# Patient Record
Sex: MALE | URBAN - METROPOLITAN AREA
[De-identification: names, ages, dates, MRNs, and addresses within clinical notes are randomized per-mention and may not be internally consistent; named-entity substitution may affect disease eponyms.]

---

## 2018-02-19 ENCOUNTER — OFFICE VISIT - HEALTHEAST (OUTPATIENT)
Dept: FAMILY MEDICINE | Facility: CLINIC | Age: 28
End: 2018-02-19

## 2018-02-19 DIAGNOSIS — Z13.220 SCREENING FOR LIPID DISORDERS: ICD-10-CM

## 2018-02-19 DIAGNOSIS — M25.532 BILATERAL WRIST PAIN: ICD-10-CM

## 2018-02-19 DIAGNOSIS — M54.6 THORACIC BACK PAIN: ICD-10-CM

## 2018-02-19 DIAGNOSIS — Z23 NEED FOR VACCINATION: ICD-10-CM

## 2018-02-19 DIAGNOSIS — Z00.00 ROUTINE HEALTH MAINTENANCE: ICD-10-CM

## 2018-02-19 DIAGNOSIS — M25.531 BILATERAL WRIST PAIN: ICD-10-CM

## 2018-02-19 DIAGNOSIS — Z13.1 SCREENING FOR DIABETES MELLITUS: ICD-10-CM

## 2018-02-19 ASSESSMENT — MIFFLIN-ST. JEOR: SCORE: 1777.25

## 2018-02-26 ENCOUNTER — COMMUNICATION - HEALTHEAST (OUTPATIENT)
Dept: FAMILY MEDICINE | Facility: CLINIC | Age: 28
End: 2018-02-26

## 2018-02-26 ENCOUNTER — AMBULATORY - HEALTHEAST (OUTPATIENT)
Dept: LAB | Facility: CLINIC | Age: 28
End: 2018-02-26

## 2018-02-26 DIAGNOSIS — Z13.1 SCREENING FOR DIABETES MELLITUS: ICD-10-CM

## 2018-02-26 DIAGNOSIS — Z13.220 SCREENING FOR LIPID DISORDERS: ICD-10-CM

## 2018-02-26 LAB
CHOLEST SERPL-MCNC: 145 MG/DL
FASTING STATUS PATIENT QL REPORTED: YES
FASTING STATUS PATIENT QL REPORTED: YES
GLUCOSE BLD-MCNC: 96 MG/DL (ref 70–125)
HDLC SERPL-MCNC: 50 MG/DL
LDLC SERPL CALC-MCNC: 84 MG/DL
TRIGL SERPL-MCNC: 54 MG/DL

## 2019-02-14 ENCOUNTER — COMMUNICATION - HEALTHEAST (OUTPATIENT)
Dept: NURSING | Facility: CLINIC | Age: 29
End: 2019-02-14

## 2020-10-26 ENCOUNTER — OFFICE VISIT - HEALTHEAST (OUTPATIENT)
Dept: UROLOGY | Facility: CLINIC | Age: 30
End: 2020-10-26

## 2020-10-26 DIAGNOSIS — N20.0 CALCULUS OF KIDNEY: ICD-10-CM

## 2020-10-26 DIAGNOSIS — N21.0 CALCULUS OF BLADDER: ICD-10-CM

## 2020-10-26 DIAGNOSIS — N50.811 PAIN IN RIGHT TESTICLE: ICD-10-CM

## 2020-10-26 DIAGNOSIS — N13.2 HYDRONEPHROSIS WITH URINARY OBSTRUCTION DUE TO URETERAL CALCULUS: ICD-10-CM

## 2020-10-26 RX ORDER — OXYCODONE HYDROCHLORIDE 5 MG/1
5 TABLET ORAL EVERY 6 HOURS PRN
Qty: 12 TABLET | Refills: 0 | Status: SHIPPED | OUTPATIENT
Start: 2020-10-26

## 2020-10-26 RX ORDER — ONDANSETRON 4 MG/1
4 TABLET, FILM COATED ORAL EVERY 8 HOURS PRN
Status: SHIPPED | COMMUNITY
Start: 2020-10-26

## 2020-10-26 RX ORDER — ACETAMINOPHEN 500 MG
1000 TABLET ORAL EVERY 6 HOURS PRN
Status: SHIPPED | COMMUNITY
Start: 2020-10-26

## 2020-10-26 RX ORDER — TAMSULOSIN HYDROCHLORIDE 0.4 MG/1
0.4 CAPSULE ORAL DAILY
Status: SHIPPED | COMMUNITY
Start: 2020-10-26

## 2020-10-29 ENCOUNTER — COMMUNICATION - HEALTHEAST (OUTPATIENT)
Dept: UROLOGY | Facility: CLINIC | Age: 30
End: 2020-10-29

## 2021-05-30 ENCOUNTER — RECORDS - HEALTHEAST (OUTPATIENT)
Dept: ADMINISTRATIVE | Facility: CLINIC | Age: 31
End: 2021-05-30

## 2021-06-01 VITALS — BODY MASS INDEX: 26.07 KG/M2 | WEIGHT: 182.1 LBS | HEIGHT: 70 IN

## 2021-06-12 NOTE — PROGRESS NOTES
Assessment/Plan:        Diagnoses and all orders for this visit:    Hydronephrosis with urinary obstruction due to ureteral calculus    Calculus of bladder    Calculus of kidney  -     oxyCODONE (ROXICODONE) 5 MG immediate release tablet; Take 1 tablet (5 mg total) by mouth every 6 (six) hours as needed for pain.  Dispense: 12 tablet; Refill: 0    Pain in right testicle  -     oxyCODONE (ROXICODONE) 5 MG immediate release tablet; Take 1 tablet (5 mg total) by mouth every 6 (six) hours as needed for pain.  Dispense: 12 tablet; Refill: 0    Other orders  -     acetaminophen (TYLENOL) 500 MG tablet; Take 1,000 mg by mouth every 6 (six) hours as needed for pain.  -     tamsulosin (FLOMAX) 0.4 mg cap; Take 0.4 mg by mouth daily.  -     oxybutynin (DITROPAN) 5 MG tablet; Take 5 mg by mouth 3 (three) times a day.  -     ondansetron (ZOFRAN) 4 MG tablet; Take 4 mg by mouth every 8 (eight) hours as needed for nausea.      Stone Management Plan  [facility] 10/26/2020   Urinary Tract Infection No suspicion of infection   Renal Colic Well controlled symptoms   Renal Failure No suspicion of renal failure   Current CT date 10/23/2020   Right sided stones? Yes   R Number of ureteral stones No ureteral stones   R Number of kidney stones  2   R GSD of kidney stones 4 - 10   R Hydronephrosis Mild   R Stone Event New event   Diagnosis date 10/23/2020   Initial location of primary symptomatic stone (No Data)   Initial GSD of primary symptomatic stone 3   R MET status Initiation   R Current Plan MET   MET 1 week F/U   Left sided stones? Yes   L Number of ureteral stones No ureteral stones   L Number of kidney stones  1   L GSD of kidney stones 4 - 10   L Hydronephrosis None   L Stone Event No current event   L Current Plan Observe   Observe rationale Limited stone burden with good prognosis for spontaneous passage         Phone call duration: 32 minutes    Alisa Aguilar PA-C    Subjective:        The patient has been notified  "of following:     \"This telephone visit will be conducted via a call between you and your physician/provider. We have found that certain health care needs can be provided without the need for a physical exam.  This service lets us provide the care you need with a short phone conversation. If a prescription is necessary, we can send it directly to your pharmacy.  If labs and/or imaging are needed, we can place orders so that you can have the test (s) done at a later time.    If during the course of the call the physician/provider feels a telephone visit is not appropriate, you will not be charged for this service.\"     HPI  Mr. Armand Nolasco is a 30 y.o. Yi male who is being evaluated via a billable telephone visit by [facility] following [facility] ER visit for urolithiasis.    He is a first time unidentified composition stone former. He has no identified modifiable stone risk factors. He has identified non-modifiable stone risks including:  bilateral stones.    He was seen in ER 10/23/20 for acute onset right lower abdominal pain x 1 day. The pain was sharp and constant, radiating to the right testicle. He had associated nausea and sweating. No associated alleviating or aggravating factors. Workup was notable for CT reporting a bladder stone, mild right hydronephrosis and renal stones. He was sent home with medications.     He has continued to have right sided pain in the flank and testicle. He has not seen a stone pass. He denies symptoms of fever, chills, nausea, vomiting, urinary frequency and dysuria.     CT scan from 10/23/20 is personally reviewed and demonstrates a mildly obstructing 3 mm stone in bladder. Nonobstructing stones lower poles of both kidneys, 2 on the right and 1 on the left, largest 5 mm.    Significant labs from presentation include moderate hematuria, mild pyuria, negative nitrite, no bacteria, no growth on urine culture, slightly WBC, normal creatinine and " normal potassium.    PLAN     29 yo Polish speaking M with interval passage of right renal stone, seen in bladder. Nonobstructing, bilateral renal stones with persistent right sided pain.    If he continues to have pain, will assume another stone event has evolved and will follow up with repeat imaging this week. Oxycodone was prescribed. Patient verbalized understanding. Patient agrees with plan as discussed. If pain resolves, he will be recommended to initiate prevention workup.       ROS   A 12 point comprehensive review of systems is negative except for HPI    Past Medical History:   Diagnosis Date     Gastric ulcer      Past Surgical History:   Procedure Laterality Date     NASAL FRACTURE SURGERY       Current Outpatient Medications   Medication Sig Dispense Refill     acetaminophen (TYLENOL) 500 MG tablet Take 1,000 mg by mouth every 6 (six) hours as needed for pain.       ibuprofen (ADVIL,MOTRIN) 600 MG tablet Take 1 tablet (600 mg total) by mouth every 6 (six) hours as needed for pain. 30 tablet 0     tamsulosin (FLOMAX) 0.4 mg cap Take 0.4 mg by mouth daily.       esomeprazole (NEXIUM) 20 MG capsule Take 20 mg by mouth as needed.       ondansetron (ZOFRAN) 4 MG tablet Take 4 mg by mouth every 8 (eight) hours as needed for nausea.       oxybutynin (DITROPAN) 5 MG tablet Take 5 mg by mouth 3 (three) times a day.       oxyCODONE (ROXICODONE) 5 MG immediate release tablet Take 1 tablet (5 mg total) by mouth every 6 (six) hours as needed for pain. 12 tablet 0     UNABLE TO FIND Take 1 tablet by mouth. Med Name: ultra triple joint health supplements       No current facility-administered medications for this visit.        Allergies   Allergen Reactions     Dust [Other Environmental Allergy] Shortness Of Breath     Hard to breath     Pollen Extracts Itching     Itchy eyes, sneezing       Social History     Socioeconomic History     Marital status:      Spouse name: Not on file     Number of children: Not  on file     Years of education: Not on file     Highest education level: Not on file   Occupational History     Not on file   Social Needs     Financial resource strain: Not on file     Food insecurity     Worry: Not on file     Inability: Not on file     Transportation needs     Medical: Not on file     Non-medical: Not on file   Tobacco Use     Smoking status: Never Smoker     Smokeless tobacco: Never Used   Substance and Sexual Activity     Alcohol use: Yes     Alcohol/week: 1.0 standard drinks     Types: 1 Cans of beer per week     Drug use: No     Sexual activity: Yes   Lifestyle     Physical activity     Days per week: Not on file     Minutes per session: Not on file     Stress: Not on file   Relationships     Social connections     Talks on phone: Not on file     Gets together: Not on file     Attends Zoroastrianism service: Not on file     Active member of club or organization: Not on file     Attends meetings of clubs or organizations: Not on file     Relationship status: Not on file     Intimate partner violence     Fear of current or ex partner: Not on file     Emotionally abused: Not on file     Physically abused: Not on file     Forced sexual activity: Not on file   Other Topics Concern     Not on file   Social History Narrative     Not on file       Family History   Problem Relation Age of Onset     No Medical Problems Mother      No Medical Problems Father      No Medical Problems Sister      No Medical Problems Brother        Objective:      Physical Exam  There were no vitals filed for this visit.    Labs    Urinalysis POC (Office):  Nitrite, UA   Date Value Ref Range Status   10/23/2020 Negative Negative Final       Lab Urinalysis:  Blood, UA   Date Value Ref Range Status   10/23/2020 Moderate (!) Negative Final     Nitrite, UA   Date Value Ref Range Status   10/23/2020 Negative Negative Final     Leukocytes, UA   Date Value Ref Range Status   10/23/2020 Negative Negative Final     pH, UA   Date Value  Ref Range Status   10/23/2020 9.0 (H) 4.5 - 8.0 Final    and Acute Labs   CBC   WBC   Date Value Ref Range Status   10/23/2020 12.4 (H) 4.0 - 11.0 thou/uL Final     Hemoglobin   Date Value Ref Range Status   10/23/2020 16.1 14.0 - 18.0 g/dL Final     Platelets   Date Value Ref Range Status   10/23/2020 345 140 - 440 thou/uL Final   , Renal Panel  [facility]  Creatinine   Date Value Ref Range Status   10/23/2020 0.97 0.70 - 1.30 mg/dL Final     Potassium   Date Value Ref Range Status   10/23/2020 3.8 3.5 - 5.0 mmol/L Final     Calcium   Date Value Ref Range Status   10/23/2020 10.2 8.5 - 10.5 mg/dL Final    and Urine Culture    Culture   Date Value Ref Range Status   10/23/2020 No Growth  Final

## 2021-06-12 NOTE — PATIENT INSTRUCTIONS - HE
Symptom Control While Passing A Stone    The goal of [facility] is to let a smaller kidney stone (less than 4 to 5 mm) pass without intervention if possible. Giving your body a chance to clear the stone may take a few hours up to a few weeks.  Keeping you well-informed, safe and fairly comfortable is important.    Drink to thirst  Do not attempt to  flush out  your stone by drinking too much fluid. This does not work and may increase nausea. Drink enough to satisfy your body s thirst. Eating your normal diet is fine.   Medications (that may be suggested or prescribed)    Ibuprofen (Advil or Motrin) Available over the counter  o Take two (200mg) tablets every six hours until the stone passes.  o Prevents spasm of the ureter.    o Decreases pain.      Dramamine* (drowsy version, non-generic formulation) Available over the counter  o Take 50mg at bedtime  o Decreases spasms of the ureter  o Decreases nausea  o Decreases acute pain  o Decreases recurrence of pain for 24 hours  o Will help you sleep  *This medication will cause increase drowsiness, do not drive or operate machinery for 6 hours.      Narcotics (Percocet, Vicodin, Dilaudid) Take as prescribed for severe pain unrelieved by ibuprofen and Dramamine  o Narcotics have significant side effects and only  cover-up  pain. They have no effect on the cause of pain.  o Common side effects  - Confusion, disorientation and sedation - DO NOT DRIVE OR OPERATE MACHINERY WITHIN 24 HOURS  - Nausea - take Dramamine or Zofran or Haldol to help control  - Constipation  - Sleep disturbances      Ondansetron (Zofran) Take as prescribed  o Reserve for severe nausea  o May cause constipation, start over the counter Miralax if needed      Second Line Anti-Nausea Medication: Adding a different anti-nausea medication maybe helpful for persistent nausea.  The combined effect of different types of anti-nausea medications maybe more effective than either medication by itself,  even in higher doses.  o Compazine: Take as prescribed      Information about kidney stones    Crystals can form if chemicals are too concentrated in your urine. If the crystal grows over time, a stone may form. A stone usually isn t painful while it is still in the kidney.    As the stone begins to leave the kidney, you may experience episodes of flank pain as the kidney stone approaches the entrance to the ureter. Some people feel a vague ache in the side.    Kidney stones may fall into the ureter. Some stones are tiny and pass through without causing symptoms. The ureter is a small tube (approximately 1/8 of an inch wide). A kidney stone can get stuck and block the ureter. If this happens, urine backs up and flows back to the kidney. Back pressure on the kidney can cause:  o Severe flank pain radiating to the groin.  o Severe nausea and vomiting.  o The pain can occur in the lower back, side, groin or all three.      When the stone reaches the lower ureter, this can irritate the bladder and sensations of feeling the urge to urinate frequently and urgently may occur.      Once the stone passes out of your ureter and into your bladder, the symptoms of urgency and frequency will often disappear. Sometimes pain will come back for a short period and will not be as severe as before. The passage of the stone from your bladder and out of your body is usually not a problem. The urethra is at least twice as wide as the normal ureter, so the stone doesn t usually block it.    Strain all urine  If you pass the stone, save it. Place it in the container we have provided and bring it to the [facility] within a week of passing it. Your stone will then be sent for analysis which takes about a month.     Signs and symptoms you might experience    Nausea    Decreased appetite    Urinary frequency    Bloody urine     Chills    Fatigue    When to call [facility] or go to the Emergency Room    Fever with a  temperature greater than 100.1    Severe pain    Persistent nausea/vomiting    If the pain worsens or nausea/vomiting is uncontrolled with medications, STOP eating & drinking. You need to have an empty stomach for 8 hours prior to surgery. Call the [facility] immediately at [phone].           Follow-up    Low dose CT scan with doctor visit 1-2 weeks after initial clinic visit per doctor s instructions    Please cancel the CT scan visit if you pass a stone. Reschedule for a one month follow-up with doctor to discuss stone composition and future prevention.    Preventing future stones    Approximately a month after your stone is sent out for analysis, a prevention visit will occur with your provider, to discuss an individualized plan for prevention of new stones and to discuss managing stones that you may still have. Along with the analysis of the kidney stone, blood and urine tests may be indicated to develop this plan. Knowing the type of kidney stones you make, and why, allows the providers at the [facility] to recommend specific ways to prevent them.    Follow-up visits are an important part of monitoring and preventing future re-occurrences.    The [facility] is available for questions or concerns 24 hours a day at [phone]

## 2021-06-24 NOTE — PROGRESS NOTES
Care Guide called patient.  If this patient is returning my call please transfer to Roslyn Biggs at ext [phone].  [facility] former Participant:    PC is considering will call back if interested.

## 2022-06-24 NOTE — PROGRESS NOTES
"Assessment/Plan:     1. Routine health maintenance  Healthy adult male    2. Screening for lipid disorders  - Lipid [place] FASTING; Future    3. Screening for diabetes mellitus  - Glucose; Future    4. Thoracic back pain  Back pain appears musculoskeletal.  Discussed good lifting techniques, especially with cleaning.  Recommend stretches as printed on AVS.  Ice and/or heat application.  May trial some Tylenol or Ibuprofen.  Recommend follow up if symptoms worsen/fail to improve.  Consider PT.     5. Bilateral wrist pain  ?Tendonitis.  No pain on exam today.  No evidence of carpal tunnel or weaknesses.  Recommend decreasing any repetitive activity.  Tylenol or Ibuprofen for pain.     6. Need for vaccination  - Tdap vaccine,  8yo or older,  IM  - Influenza, Seasonal,Quad Inj, 36+ MOS        Subjective:   Armand Villavicencio  is a 27 y.o. male who presents for an annual exam.  Patient is accompanied by his wife and a .  Patient is originally from [facility].  He moved here in 2016 to be closer to family.  He currently works as a .  Pertinent history of nasal fracture and gastric ulcer.  He takes Nexium as needed for acid reflux symptoms; currently asymptomatic.    Patient complains of mid-thoracic back pain x 1.5 months.  Symptoms come and go.  Worse with bending over.  Sometimes pain radiates proximally.  Denies any paresthesias in the arms/hands.  No radiation of pain into the buttocks or legs.  Patient also complains of bilateral wrist pain that comes and goes.  This has been present for many years.  States pain is worse after large amount of activity.  No hand paresthesias or weakness.  Denies any joint swelling or redness.  Sometimes his wrist pain is worse after eating large amount of meat or alcohol.  He has recently started using a \"joint\" supplement, but has not noticed any improvement.  No other treatments tried.        Healthy Habits:   Regular Exercise: no  Sunscreen Use: no  Healthy " Congestive heart failure due to coronary artery disease (CAD) and hypertension.  Heart failure is stable.    Continue current treatment regimen.  Dietary sodium restriction.  Encouraged daily monitoring of the patient's weight.  Regular aerobic exercise.  Continue current medications.  follow with cardiology   Heart failure will be reassessed per cards .  Cont with torsemide and labetalol,  Report a 2lb daily or 5lb weekly weight gain    "Diet: no  Dental Visits Regularly: no  Seat Belt: 100%  Sexually active: yes  Hemoccults: na  Flex Sig: na  Colonoscopy: na  Lipid Profile: na  Glucose Screen: na    There is no immunization history for the selected administration types on file for this patient.    Immunization status: up to date and documented.    Current Outpatient Prescriptions   Medication Sig Dispense Refill     esomeprazole (NEXIUM) 20 mg packet Take 20 mg by mouth daily.       UNABLE TO FIND Take 1 tablet by mouth. Med Name: ultra triple joint health supplements       No current facility-administered medications for this visit.        No past medical history on file.  Past Surgical History:   Procedure Laterality Date     NASAL FRACTURE SURGERY          Allergies   Allergen Reactions     Dust [Other Environmental Allergy] Shortness Of Breath     Hard to breath     Pollen Extracts Itching     Itchy eyes, sneezing     Family History   Problem Relation Age of Onset     No Medical Problems Mother      No Medical Problems Father      No Medical Problems Sister      No Medical Problems Brother      Social History     Social History     Marital status: Single     Spouse name: N/A     Number of children: N/A     Years of education: N/A     Occupational History     Not on file.     Social History Main Topics     Smoking status: Never Smoker     Smokeless tobacco: Never Used     Alcohol use 0.6 oz/week     1 Cans of beer per week     Drug use: No     Sexual activity: Yes     Other Topics Concern     Not on file     Social History Narrative     No narrative on file        Review of Systems  Fourteen point review of systems negative except as mentioned in HPI    Objective:      Vitals:    02/19/18 1504   BP: 126/84   Patient Site: Right Arm   Patient Position: Sitting   Cuff Size: Adult Regular   Pulse: 72   Weight: 182 lb 1.6 oz (82.6 kg)   Height: 5' 10\" (1.778 m)     Body mass index is 26.13 kg/(m^2).    Physical Exam:  General Appearance: Alert, " cooperative, no distress, appears stated age  Head: Normocephalic, without obvious abnormality, atraumatic  Eyes: PERRL, conjunctiva/corneas clear, EOM's intact. Wearing corrective glasses.  Ears: Normal TM's and external ear canals, both ears  Nose: Nares normal, septum midline,mucosa normal, no drainage  Throat: Lips, mucosa, and tongue normal; teeth and gums normal  Neck: Supple, symmetrical, trachea midline, no adenopathy;  thyroid: not enlarged, symmetric, no tenderness/mass/nodules; no carotid bruit or JVD  Back: Symmetric, no curvature, ROM normal, no CVA tenderness  Lungs: Clear to auscultation bilaterally, respirations unlabored  Heart: Regular rate and rhythm, S1 and S2 normal, no murmur, rub, or gallop   Abdomen: Soft, non-tender, bowel sounds active all four quadrants,  no masses, no organomegaly  Extremities: Extremities normal, atraumatic, no cyanosis or edema. Bilateral wrists normal; no redness or swelling.   strength equal bilaterally.   Skin: Skin color, texture, turgor normal, no rashes or lesions  Lymph nodes: Cervical, supraclavicular, and axillary nodes normal  Neurologic: Normal   Psychologic: appropriate affective, answers all of my questions appropriately. No hallucinations, delusion, or suicidal ideations.    BISMARK Reza